# Patient Record
(demographics unavailable — no encounter records)

---

## 2025-06-28 NOTE — ASSESSMENT
[FreeTextEntry1] : #Filiform wart -Disc nature, treatment options reviewed including observation, OTC medications, topicals, cryo The risks/benefits/alternatives of cryo-destruction was explained to the patient which, include but are not limited to redness, swelling, pain, blistering, scar, discoloration of skin, and recurrence. The patient expressed understanding of these risks and agreed to the procedure. 1 lesions treated with 2 cycle of LN2. The procedure was well tolerated, without complication. We have discussed related skin care.  RTC 4-6 weeks

## 2025-06-28 NOTE — HISTORY OF PRESENT ILLNESS
[FreeTextEntry1] : NP bump [de-identified] : NP here with mom and sister Bump on neck, told likely molluscum but not going away

## 2025-07-25 NOTE — HISTORY OF PRESENT ILLNESS
[FreeTextEntry1] : RPV: wart [de-identified] : Ms. 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 F/u wart on left neck. Had cryotherapy at  6/25/25 with Dr. Marx, which did not eliminate the wart. Not using topical therapy at home.    Skin Cancer Hx: No personal history of skin cancer Family Hx: no family history of skin cancer

## 2025-07-25 NOTE — ASSESSMENT
[Use of independent historian: [ enter independent historian's relationship to patient ] :____] : As the patient was unable to provide a complete and reliable history, I obtained clinical history from the patient's [unfilled] [FreeTextEntry1] : #Filiform wart - left neck -Discussed nature, treatment options reviewed including observation, OTC medications, topicals, cryo -The risks/benefits/alternatives of cryo-destruction was explained to the patient which, include but are not limited to redness, swelling, pain, blistering, scar, discoloration of skin, and recurrence. The patient expressed understanding of these risks and agreed to the procedure. 1 lesions treated with 2 cycle of LN2. The procedure was well tolerated, without complication. We have discussed related skin care. -Advised at home topical SA therapy, instructions provided  RTC prn for wart treatment

## 2025-07-25 NOTE — PHYSICAL EXAM
[Alert] : alert [Oriented x 3] : ~L oriented x 3 [Well Nourished] : well nourished [FreeTextEntry3] : Focused skin exam performed The relevant portions of the exam were performed today AAOx3, NAD, well-appearing / pleasant Focused examination within normal limits with the exception of:  -filiform verrucous papule on left neck